# Patient Record
Sex: MALE | Race: WHITE | URBAN - METROPOLITAN AREA
[De-identification: names, ages, dates, MRNs, and addresses within clinical notes are randomized per-mention and may not be internally consistent; named-entity substitution may affect disease eponyms.]

---

## 2020-03-02 ENCOUNTER — APPOINTMENT (OUTPATIENT)
Dept: URBAN - METROPOLITAN AREA CLINIC 232 | Age: 65
Setting detail: DERMATOLOGY
End: 2020-03-02

## 2020-03-02 DIAGNOSIS — Z79.899 OTHER LONG TERM (CURRENT) DRUG THERAPY: ICD-10-CM

## 2020-03-02 DIAGNOSIS — L40.0 PSORIASIS VULGARIS: ICD-10-CM

## 2020-03-02 PROCEDURE — OTHER ORDER TESTS: OTHER

## 2020-03-02 PROCEDURE — OTHER PRESCRIPTION: OTHER

## 2020-03-02 PROCEDURE — 99213 OFFICE O/P EST LOW 20 MIN: CPT

## 2020-03-02 PROCEDURE — OTHER METHOTREXATE MONITORING: OTHER

## 2020-03-02 ASSESSMENT — PGA PSORIASIS: PGA PSORIASIS: MINIMAL (MINIMAL PLAQUE ELEVATION, FAINT ERYTHEMA, OCCASIONAL FINE SCALE)

## 2020-03-02 NOTE — PROCEDURE: METHOTREXATE MONITORING
Comments: Taking folic acid
Detail Level: Zone
Add High Risk Medication Management Associated Diagnosis?: No
Current Dosage (Optional): 12.5mg/week
Most Recent Cbc (Optional): 1/17/20 WNL
Length Of Therapy (Optional): 2.5 years

## 2020-03-02 NOTE — PROCEDURE: ORDER TESTS
Expected Date Of Service: 03/02/2020
Bill For Surgical Tray: no
Billing Type: Third-Party Bill
Clinical Notes (To The Lab): S/O Q 3 months

## 2020-05-13 ENCOUNTER — RX ONLY (RX ONLY)
Age: 65
End: 2020-05-13

## 2020-05-13 RX ORDER — CALCIPOTRIENE 0.05 MG/G
CREAM TOPICAL
Qty: 1 | Refills: 2 | Status: ERX | COMMUNITY
Start: 2020-05-13

## 2020-09-01 ENCOUNTER — APPOINTMENT (OUTPATIENT)
Dept: URBAN - METROPOLITAN AREA CLINIC 232 | Age: 65
Setting detail: DERMATOLOGY
End: 2020-09-02

## 2020-09-01 DIAGNOSIS — L71.8 OTHER ROSACEA: ICD-10-CM

## 2020-09-01 DIAGNOSIS — L40.0 PSORIASIS VULGARIS: ICD-10-CM

## 2020-09-01 DIAGNOSIS — Z79.899 OTHER LONG TERM (CURRENT) DRUG THERAPY: ICD-10-CM

## 2020-09-01 PROCEDURE — OTHER METHOTREXATE MONITORING: OTHER

## 2020-09-01 PROCEDURE — OTHER ORDER TESTS: OTHER

## 2020-09-01 PROCEDURE — OTHER PRESCRIPTION: OTHER

## 2020-09-01 PROCEDURE — OTHER COUNSELING: OTHER

## 2020-09-01 PROCEDURE — OTHER ADDITIONAL NOTES: OTHER

## 2020-09-01 PROCEDURE — 99214 OFFICE O/P EST MOD 30 MIN: CPT

## 2020-09-01 RX ORDER — METHOTREXATE SODIUM 2.5 MG/1
TABLET ORAL
Qty: 24 | Refills: 5 | Status: ERX

## 2020-09-01 RX ORDER — FOLIC ACID 1 MG/1
TABLET ORAL QD
Qty: 30 | Refills: 5 | Status: ERX

## 2020-09-01 RX ORDER — CALCIPOTRIENE 0.05 MG/G
CREAM TOPICAL
Qty: 1 | Refills: 3 | Status: ERX | COMMUNITY
Start: 2020-09-01

## 2020-09-01 RX ORDER — BETAMETHASONE DIPROPIONATE 0.5 MG/G
CREAM, AUGMENTED TOPICAL
Qty: 1 | Refills: 2 | Status: ERX | COMMUNITY
Start: 2020-09-01

## 2020-09-01 RX ORDER — MINOCYCLINE HYDROCHLORIDE 100 MG/1
CAPSULE ORAL
Qty: 60 | Refills: 3 | Status: ERX | COMMUNITY
Start: 2020-09-01

## 2020-09-01 ASSESSMENT — LOCATION ZONE DERM
LOCATION ZONE: FACE
LOCATION ZONE: FEET
LOCATION ZONE: LEG
LOCATION ZONE: ARM
LOCATION ZONE: EAR

## 2020-09-01 ASSESSMENT — LOCATION DETAILED DESCRIPTION DERM
LOCATION DETAILED: LEFT CENTRAL MALAR CHEEK
LOCATION DETAILED: RIGHT ELBOW
LOCATION DETAILED: LEFT POSTERIOR LATERAL MALLEOLUS
LOCATION DETAILED: LEFT DORSAL FOOT
LOCATION DETAILED: LEFT SUPERIOR CRUS OF ANTIHELIX
LOCATION DETAILED: LEFT KNEE

## 2020-09-01 ASSESSMENT — LOCATION SIMPLE DESCRIPTION DERM
LOCATION SIMPLE: LEFT CHEEK
LOCATION SIMPLE: LEFT FOOT
LOCATION SIMPLE: LEFT ANKLE
LOCATION SIMPLE: LEFT EAR
LOCATION SIMPLE: RIGHT ELBOW
LOCATION SIMPLE: LEFT KNEE

## 2020-09-01 ASSESSMENT — PGA PSORIASIS: PGA PSORIASIS 2020: ALMOST CLEAR

## 2020-09-01 ASSESSMENT — BSA PSORIASIS: % BODY COVERED IN PSORIASIS: 2

## 2020-09-01 NOTE — PROCEDURE: METHOTREXATE MONITORING
Most Recent Cbc (Optional): WNL 8/18/20
Comments: Pt has been using 5 tabs weekly of MTX but may try to incresae to 6 tabs weekly to determine benefit. He has been on 8 tabs weekly in the past but was doing well so he has slowly decreased his dose. Pt reports he has never had side effects with any dose. Since pt has been very stable with labs since he has been on the MTX starting 3 years ago, will have him do his labs in 6 months.
Most Recent Lfts (Optional): AST 22, ALT 25 8/18/20
Current Dosage (Optional): 12.5mg/week
Length Of Therapy (Optional): 3 yrs
Add High Risk Medication Management Associated Diagnosis?: Yes
Detail Level: Zone

## 2020-09-01 NOTE — PROCEDURE: ADDITIONAL NOTES
Additional Notes: Pt had considered Humira at one point but was not eligible for pt assistance and his co-pay was very high so he prefers to remain on MTX although he still has minor flares. He has an apporved prior auth for Calcipotriene cream thru 2/9/2021. Pt will probably use [facility] for his Folic Acid since it will be cheaper than OTC.
Detail Level: Detailed

## 2020-09-01 NOTE — PROCEDURE: COUNSELING
Patient Specific Counseling (Will Not Stick From Patient To Patient): Pt will use the minocycline prn for flares. He has been using a OTC topical product that has worked well. He cannot recall the name.
Detail Level: Zone

## 2021-02-25 ENCOUNTER — APPOINTMENT (OUTPATIENT)
Dept: URBAN - METROPOLITAN AREA CLINIC 232 | Age: 66
Setting detail: DERMATOLOGY
End: 2021-02-26

## 2021-02-25 ENCOUNTER — RX ONLY (RX ONLY)
Age: 66
End: 2021-02-25

## 2021-02-25 DIAGNOSIS — L40.0 PSORIASIS VULGARIS: ICD-10-CM

## 2021-02-25 DIAGNOSIS — Z79.899 OTHER LONG TERM (CURRENT) DRUG THERAPY: ICD-10-CM

## 2021-02-25 PROCEDURE — OTHER METHOTREXATE MONITORING: OTHER

## 2021-02-25 PROCEDURE — OTHER COUNSELING: OTHER

## 2021-02-25 PROCEDURE — OTHER PRESCRIPTION: OTHER

## 2021-02-25 PROCEDURE — OTHER ADDITIONAL NOTES: OTHER

## 2021-02-25 PROCEDURE — OTHER TREATMENT REGIMEN: OTHER

## 2021-02-25 PROCEDURE — OTHER ORDER TESTS: OTHER

## 2021-02-25 PROCEDURE — 99213 OFFICE O/P EST LOW 20 MIN: CPT

## 2021-02-25 RX ORDER — FOLIC ACID 1 MG/1
TABLET ORAL
Qty: 30 | Refills: 11 | Status: ERX

## 2021-02-25 RX ORDER — CALCIPOTRIENE 0.05 MG/G
CREAM TOPICAL
Qty: 1 | Refills: 11 | Status: ERX

## 2021-02-25 RX ORDER — BETAMETHASONE DIPROPIONATE 0.5 MG/ML
LOTION, AUGMENTED TOPICAL
Qty: 1 | Refills: 3 | Status: CANCELLED

## 2021-02-25 RX ORDER — BETAMETHASONE DIPROPIONATE 0.5 MG/G
CREAM, AUGMENTED TOPICAL
Qty: 1 | Refills: 5 | Status: ERX

## 2021-02-25 RX ORDER — METHOTREXATE SODIUM 2.5 MG/1
TABLET ORAL
Qty: 24 | Refills: 11 | Status: ERX

## 2021-02-25 ASSESSMENT — LOCATION SIMPLE DESCRIPTION DERM
LOCATION SIMPLE: RIGHT ELBOW
LOCATION SIMPLE: LEFT KNEE
LOCATION SIMPLE: LEFT ANKLE
LOCATION SIMPLE: LEFT FOOT
LOCATION SIMPLE: RIGHT KNEE
LOCATION SIMPLE: LEFT ELBOW
LOCATION SIMPLE: LEFT EAR

## 2021-02-25 ASSESSMENT — LOCATION DETAILED DESCRIPTION DERM
LOCATION DETAILED: LEFT ELBOW
LOCATION DETAILED: RIGHT ELBOW
LOCATION DETAILED: LEFT SUPERIOR CRUS OF ANTIHELIX
LOCATION DETAILED: LEFT POSTERIOR LATERAL MALLEOLUS
LOCATION DETAILED: LEFT DORSAL FOOT
LOCATION DETAILED: RIGHT KNEE
LOCATION DETAILED: LEFT KNEE

## 2021-02-25 ASSESSMENT — LOCATION ZONE DERM
LOCATION ZONE: ARM
LOCATION ZONE: FEET
LOCATION ZONE: EAR
LOCATION ZONE: LEG

## 2021-02-25 NOTE — PROCEDURE: ADDITIONAL NOTES
Detail Level: Detailed
Additional Notes: Pt had considered Humira at one point but was not eligible for pt assistance and his co-pay was very high so he prefers to remain on MTX although he still has minor flares. Pt will probably use [facility] for his Folic Acid since it will be cheaper than OTC.

## 2021-02-25 NOTE — PROCEDURE: TREATMENT REGIMEN
Action 2: Continue
Sig For Treatment 1 (If Needed): 12.5 mg weekly
Treatment 1: Methotrexate
Sig For Treatment 2 (If Needed): twice daily
Treatment 3: Calcipotriene cream
Other Instructions: Pt will use the Betamethasone bid for 2 weeks to help clear the remaider of his plaques then back to weekends and calcipotriene on weekdays. He is not interested in changing his regimen since it is working well and affordable.
Treatment 2: Betamethasone diproprionate cream
Detail Level: Zone

## 2021-02-25 NOTE — PROCEDURE: METHOTREXATE MONITORING
Detail Level: Zone
Most Recent Cbc (Optional): WNL 8/18/20
Comments: Pt has been using 6 tabs weekly of MTX.  Pt reports he has never had side effects with any dose. Since pt has been very stable with labs since he has been on the MTX starting 3 years ago, will have him do his labs before next visit.
Current Dosage (Optional): 12.5mg/week
Length Of Therapy (Optional): 3 yrs
Add High Risk Medication Management Associated Diagnosis?: Yes
Most Recent Lfts (Optional): AST 22, ALT 25 8/18/20

## 2021-03-08 ENCOUNTER — APPOINTMENT (OUTPATIENT)
Dept: URBAN - METROPOLITAN AREA CLINIC 232 | Age: 66
Setting detail: DERMATOLOGY
End: 2021-03-09

## 2021-03-08 DIAGNOSIS — L40.0 PSORIASIS VULGARIS: ICD-10-CM

## 2021-03-08 PROCEDURE — OTHER PATIENT SPECIFIC COUNSELING: OTHER

## 2021-03-08 PROCEDURE — OTHER ORDER TESTS: OTHER

## 2021-03-08 NOTE — PROCEDURE: PATIENT SPECIFIC COUNSELING
Pt will try to lower his dose further and see if he can remian clear. He knows he can increase back to 15mg q week if needed. Lab slip will be mailed to his home address. He knows to get it checked again in 3-4 months.
Detail Level: Zone

## 2021-06-16 ENCOUNTER — APPOINTMENT (OUTPATIENT)
Dept: URBAN - METROPOLITAN AREA CLINIC 232 | Age: 66
Setting detail: DERMATOLOGY
End: 2021-06-16

## 2021-06-16 DIAGNOSIS — L40.0 PSORIASIS VULGARIS: ICD-10-CM

## 2021-06-16 PROCEDURE — OTHER ORDER TESTS: OTHER

## 2021-06-16 PROCEDURE — OTHER PATIENT SPECIFIC COUNSELING: OTHER

## 2021-06-16 NOTE — PROCEDURE: PATIENT SPECIFIC COUNSELING
Lab slip will be mailed to his home address. He knows to get it checked again in 3-4 months.
Detail Level: Zone

## 2021-12-03 ENCOUNTER — APPOINTMENT (OUTPATIENT)
Dept: URBAN - METROPOLITAN AREA CLINIC 232 | Age: 66
Setting detail: DERMATOLOGY
End: 2021-12-03

## 2021-12-03 DIAGNOSIS — L40.0 PSORIASIS VULGARIS: ICD-10-CM

## 2021-12-03 PROCEDURE — OTHER ORDER TESTS: OTHER

## 2021-12-03 PROCEDURE — OTHER PATIENT SPECIFIC COUNSELING: OTHER

## 2021-12-03 NOTE — PROCEDURE: PATIENT SPECIFIC COUNSELING
Detail Level: Detailed
Pt is overdue for labs for MTX. New labslip aaron be mailed to home address. These are due before the end of the year.

## 2021-12-14 ENCOUNTER — APPOINTMENT (OUTPATIENT)
Dept: URBAN - METROPOLITAN AREA CLINIC 232 | Age: 66
Setting detail: DERMATOLOGY
End: 2021-12-15

## 2021-12-14 DIAGNOSIS — L40.0 PSORIASIS VULGARIS: ICD-10-CM

## 2021-12-14 PROCEDURE — OTHER ORDER TESTS: OTHER

## 2021-12-14 PROCEDURE — OTHER METHOTREXATE MONITORING: OTHER

## 2021-12-14 NOTE — PROCEDURE: METHOTREXATE MONITORING
Current Dosage (Optional): 15mg/week
Add High Risk Medication Management Associated Diagnosis?: Yes
Detail Level: Zone
Most Recent Lfts (Optional): 12/13/21
Comments: Labslip was mailed to pt's home address and to be done April 2022.

## 2022-02-21 RX ORDER — METHOTREXATE SODIUM 2.5 MG/1
TABLET ORAL
Qty: 24 | Refills: 0 | Status: ERX

## 2022-02-28 ENCOUNTER — APPOINTMENT (OUTPATIENT)
Dept: URBAN - METROPOLITAN AREA CLINIC 232 | Age: 67
Setting detail: DERMATOLOGY
End: 2022-03-01

## 2022-02-28 DIAGNOSIS — L40.0 PSORIASIS VULGARIS: ICD-10-CM

## 2022-02-28 DIAGNOSIS — L57.0 ACTINIC KERATOSIS: ICD-10-CM

## 2022-02-28 PROCEDURE — OTHER ORDER TESTS: OTHER

## 2022-02-28 PROCEDURE — OTHER COUNSELING: OTHER

## 2022-02-28 PROCEDURE — OTHER METHOTREXATE MONITORING: OTHER

## 2022-02-28 PROCEDURE — OTHER PRESCRIPTION: OTHER

## 2022-02-28 PROCEDURE — OTHER TREATMENT REGIMEN: OTHER

## 2022-02-28 PROCEDURE — OTHER MIPS QUALITY: OTHER

## 2022-02-28 PROCEDURE — 17000 DESTRUCT PREMALG LESION: CPT

## 2022-02-28 PROCEDURE — 99213 OFFICE O/P EST LOW 20 MIN: CPT | Mod: 25

## 2022-02-28 PROCEDURE — OTHER LIQUID NITROGEN: OTHER

## 2022-02-28 RX ORDER — FOLIC ACID 1 MG/1
TABLET ORAL QD
Qty: 30 | Refills: 11 | Status: ERX

## 2022-02-28 RX ORDER — METHOTREXATE SODIUM 2.5 MG/1
TABLET ORAL
Qty: 24 | Refills: 11 | Status: ERX

## 2022-02-28 RX ORDER — BETAMETHASONE DIPROPIONATE 0.5 MG/G
CREAM, AUGMENTED TOPICAL
Qty: 50 | Refills: 5 | Status: ERX

## 2022-02-28 ASSESSMENT — LOCATION ZONE DERM
LOCATION ZONE: ARM
LOCATION ZONE: LEG
LOCATION ZONE: FACE
LOCATION ZONE: NOSE

## 2022-02-28 ASSESSMENT — LOCATION SIMPLE DESCRIPTION DERM
LOCATION SIMPLE: LEFT ELBOW
LOCATION SIMPLE: NOSE
LOCATION SIMPLE: RIGHT TEMPLE
LOCATION SIMPLE: RIGHT ELBOW
LOCATION SIMPLE: LEFT KNEE
LOCATION SIMPLE: RIGHT KNEE

## 2022-02-28 ASSESSMENT — LOCATION DETAILED DESCRIPTION DERM
LOCATION DETAILED: NASAL TIP
LOCATION DETAILED: RIGHT INFERIOR TEMPLE
LOCATION DETAILED: LEFT KNEE
LOCATION DETAILED: LEFT ELBOW
LOCATION DETAILED: RIGHT KNEE
LOCATION DETAILED: RIGHT ELBOW

## 2022-02-28 NOTE — PROCEDURE: METHOTREXATE MONITORING
Detail Level: Zone
Length Of Therapy (Optional): 3 yrs
Most Recent Cbc (Optional): WNL 12/13/21
Current Dosage (Optional): 12.5mg/week
Comments: Pt has been using 6 tabs weekly of MTX.  Pt reports he has never had side effects with any dose. Since pt has been very stable with labs since he has been on the MTX starting 3 years ago, will have him do his labs before next visit.
Add High Risk Medication Management Associated Diagnosis?: Yes

## 2022-02-28 NOTE — PROCEDURE: TREATMENT REGIMEN
Detail Level: Zone
Sig For Treatment 2 (If Needed): twice daily
Action 4: Continue
Continue Regimen: Folic Acid 1mg qd on non-MTX days
Treatment 2: Betamethasone diproprionate cream
Other Instructions: Lab slip given today
Sig For Treatment 1 (If Needed): 12.5 mg weekly
Sig For Treatment 3 (If Needed): applied twice daily Monday-Friday
Treatment 3: Calcipotriene cream
Treatment 1: Methotrexate

## 2022-02-28 NOTE — PROCEDURE: LIQUID NITROGEN
Render Note In Bullet Format When Appropriate: No
Consent: The patient's consent was obtained including but not limited to risks of crusting, scabbing, blistering, scarring, darker or lighter pigmentary change, recurrence, incomplete removal and infection.
Duration Of Freeze Thaw-Cycle (Seconds): 10
Detail Level: Detailed
Show Aperture Variable?: Yes
Application Tool (Optional): Cotton Tipped Applicator
Number Of Freeze-Thaw Cycles: 1 freeze-thaw cycle
Post-Care Instructions: I reviewed with the patient in detail post-care instructions. Patient is to wear sunprotection, and avoid picking at any of the treated lesions. Pt may apply Vaseline to crusted or scabbing areas.

## 2022-04-13 ENCOUNTER — APPOINTMENT (OUTPATIENT)
Dept: URBAN - METROPOLITAN AREA CLINIC 232 | Age: 67
Setting detail: DERMATOLOGY
End: 2022-04-14

## 2022-04-13 DIAGNOSIS — L40.0 PSORIASIS VULGARIS: ICD-10-CM

## 2022-04-13 PROCEDURE — OTHER ORDER TESTS: OTHER

## 2022-04-13 PROCEDURE — OTHER TREATMENT REGIMEN: OTHER

## 2022-04-13 PROCEDURE — OTHER COUNSELING: OTHER

## 2022-04-13 PROCEDURE — OTHER METHOTREXATE MONITORING: OTHER

## 2022-04-13 ASSESSMENT — LOCATION DETAILED DESCRIPTION DERM
LOCATION DETAILED: RIGHT ELBOW
LOCATION DETAILED: LEFT ELBOW
LOCATION DETAILED: RIGHT KNEE
LOCATION DETAILED: LEFT KNEE

## 2022-04-13 ASSESSMENT — LOCATION SIMPLE DESCRIPTION DERM
LOCATION SIMPLE: RIGHT KNEE
LOCATION SIMPLE: LEFT KNEE
LOCATION SIMPLE: RIGHT ELBOW
LOCATION SIMPLE: LEFT ELBOW

## 2022-04-13 ASSESSMENT — LOCATION ZONE DERM
LOCATION ZONE: ARM
LOCATION ZONE: LEG

## 2022-04-13 NOTE — PROCEDURE: TREATMENT REGIMEN
Other Instructions: Lab slip mailed to pt's home address to be done in Aug
Detail Level: Zone
Action 4: Continue
Continue Regimen: MTX 2.5mg 6 tabs q week\\nFolic Acid 1mg qd on non-MTX days
Sig For Treatment 1 (If Needed): 15 mg weekly
Sig For Treatment 3 (If Needed): applied twice daily Monday-Friday
Sig For Treatment 2 (If Needed): twice daily

## 2022-08-16 ENCOUNTER — APPOINTMENT (OUTPATIENT)
Dept: URBAN - METROPOLITAN AREA CLINIC 232 | Age: 67
Setting detail: DERMATOLOGY
End: 2022-08-17

## 2022-08-16 DIAGNOSIS — L40.0 PSORIASIS VULGARIS: ICD-10-CM

## 2022-08-16 PROCEDURE — OTHER METHOTREXATE MONITORING: OTHER

## 2022-08-16 PROCEDURE — OTHER ORDER TESTS: OTHER

## 2022-08-16 PROCEDURE — OTHER TREATMENT REGIMEN: OTHER

## 2022-08-16 PROCEDURE — OTHER COUNSELING: OTHER

## 2022-08-16 ASSESSMENT — LOCATION ZONE DERM
LOCATION ZONE: LEG
LOCATION ZONE: ARM

## 2022-08-16 ASSESSMENT — LOCATION DETAILED DESCRIPTION DERM
LOCATION DETAILED: RIGHT ELBOW
LOCATION DETAILED: LEFT KNEE
LOCATION DETAILED: LEFT ELBOW
LOCATION DETAILED: RIGHT KNEE

## 2022-08-16 ASSESSMENT — LOCATION SIMPLE DESCRIPTION DERM
LOCATION SIMPLE: RIGHT ELBOW
LOCATION SIMPLE: LEFT ELBOW
LOCATION SIMPLE: LEFT KNEE
LOCATION SIMPLE: RIGHT KNEE

## 2022-08-16 NOTE — PROCEDURE: METHOTREXATE MONITORING
Dosage 10 (Mg/Week): 0
Add Additional Dosage: No
Add High Risk Medication Management Associated Diagnosis?: Yes
Detail Level: Zone
Most Recent Lfts (Optional): WNL 8/15/22
Most Recent Cbc (Optional): WNL 8/15/22, except slightly low H/H
Calculate Cumulative Dosage Automatically?: No - Use Free Text
Length Of Therapy (Optional): 4 years
Comments: Pt has been using 6 tabs weekly of MTX.  Pt reports he has never had side effects with any dose. He will continue same regimen and recheck labs in Dec. He sees his PCP in Nov and will bring notify Dr Salazar of slightly low H/H so he is aware.
Current Dosage (Optional): 15mg/week
Document Previous Cumulative Dosage (Historical Patients Only): No - New Methotrexate Patient

## 2022-08-16 NOTE — PROCEDURE: TREATMENT REGIMEN
Action 1: Continue
Detail Level: Zone
Sig For Treatment 2 (If Needed): twice daily
Sig For Treatment 1 (If Needed): 15 mg weekly
Continue Regimen: MTX 2.5mg 6 tabs q week\\nFolic Acid 1mg qd on non-MTX days
Other Instructions: Lab slip mailed to pt's home address to be done in Dec
Sig For Treatment 3 (If Needed): applied twice daily Monday-Friday

## 2022-08-19 ENCOUNTER — RX ONLY (RX ONLY)
Age: 67
End: 2022-08-19

## 2022-08-19 RX ORDER — CALCIPOTRIENE 0.05 MG/G
CREAM TOPICAL
Qty: 60 | Refills: 11 | Status: ERX

## 2022-12-07 ENCOUNTER — APPOINTMENT (OUTPATIENT)
Dept: URBAN - METROPOLITAN AREA CLINIC 232 | Age: 67
Setting detail: DERMATOLOGY
End: 2022-12-07

## 2022-12-07 DIAGNOSIS — L40.0 PSORIASIS VULGARIS: ICD-10-CM

## 2022-12-07 PROCEDURE — OTHER TREATMENT REGIMEN: OTHER

## 2022-12-07 PROCEDURE — OTHER METHOTREXATE MONITORING: OTHER

## 2022-12-07 PROCEDURE — OTHER COUNSELING: OTHER

## 2022-12-07 PROCEDURE — OTHER ORDER TESTS: OTHER

## 2022-12-07 ASSESSMENT — LOCATION DETAILED DESCRIPTION DERM
LOCATION DETAILED: RIGHT KNEE
LOCATION DETAILED: RIGHT ELBOW
LOCATION DETAILED: LEFT ELBOW
LOCATION DETAILED: LEFT KNEE

## 2022-12-07 ASSESSMENT — LOCATION ZONE DERM
LOCATION ZONE: LEG
LOCATION ZONE: ARM

## 2022-12-07 NOTE — PROCEDURE: METHOTREXATE MONITORING
Dosage 13 (Mg/Week): 0
Add Additional Dosage: No
Comments: Pt has been using 6 tabs weekly of MTX.  Pt reports he has never had side effects with any dose and pleased with his results.
Length Of Therapy (Optional): 4 years
Most Recent Lfts (Optional): WNL 11/18/22
Add High Risk Medication Management Associated Diagnosis?: Yes
Document Previous Cumulative Dosage (Historical Patients Only): No - New Methotrexate Patient
Calculate Cumulative Dosage Automatically?: No - Use Free Text
Current Dosage (Optional): 15mg/week
Detail Level: Zone

## 2022-12-07 NOTE — PROCEDURE: TREATMENT REGIMEN
Continue Regimen: MTX 2.5mg 6 tabs q week\\nFolic Acid 1mg qd on non-MTX days
Other Instructions: Lab slip mailed to pt's home address to be done quarterly
Sig For Treatment 1 (If Needed): 15 mg weekly
Action 1: Continue
Sig For Treatment 3 (If Needed): applied twice daily Monday-Friday
Detail Level: Zone
Sig For Treatment 2 (If Needed): twice daily

## 2023-02-17 RX ORDER — METHOTREXATE SODIUM 2.5 MG/1
TABLET ORAL
Qty: 24 | Refills: 1 | Status: ERX

## 2023-03-01 ENCOUNTER — APPOINTMENT (OUTPATIENT)
Dept: URBAN - METROPOLITAN AREA CLINIC 232 | Age: 68
Setting detail: DERMATOLOGY
End: 2023-03-02

## 2023-03-01 VITALS — HEIGHT: 68 IN | WEIGHT: 207 LBS

## 2023-03-01 DIAGNOSIS — L81.4 OTHER MELANIN HYPERPIGMENTATION: ICD-10-CM

## 2023-03-01 DIAGNOSIS — L40.0 PSORIASIS VULGARIS: ICD-10-CM

## 2023-03-01 DIAGNOSIS — L82.1 OTHER SEBORRHEIC KERATOSIS: ICD-10-CM

## 2023-03-01 DIAGNOSIS — L91.8 OTHER HYPERTROPHIC DISORDERS OF THE SKIN: ICD-10-CM

## 2023-03-01 DIAGNOSIS — D22 MELANOCYTIC NEVI: ICD-10-CM

## 2023-03-01 DIAGNOSIS — D18.0 HEMANGIOMA: ICD-10-CM

## 2023-03-01 PROBLEM — D22.5 MELANOCYTIC NEVI OF TRUNK: Status: ACTIVE | Noted: 2023-03-01

## 2023-03-01 PROBLEM — D18.01 HEMANGIOMA OF SKIN AND SUBCUTANEOUS TISSUE: Status: ACTIVE | Noted: 2023-03-01

## 2023-03-01 PROCEDURE — 99213 OFFICE O/P EST LOW 20 MIN: CPT

## 2023-03-01 PROCEDURE — OTHER TREATMENT REGIMEN: OTHER

## 2023-03-01 PROCEDURE — OTHER METHOTREXATE MONITORING: OTHER

## 2023-03-01 PROCEDURE — OTHER PRESCRIPTION: OTHER

## 2023-03-01 PROCEDURE — OTHER ORDER TESTS: OTHER

## 2023-03-01 PROCEDURE — OTHER MIPS QUALITY: OTHER

## 2023-03-01 PROCEDURE — OTHER COUNSELING: OTHER

## 2023-03-01 RX ORDER — FOLIC ACID 1 MG/1
TABLET ORAL QD
Qty: 30 | Refills: 11 | Status: ERX

## 2023-03-01 RX ORDER — METHOTREXATE SODIUM 2.5 MG/1
TABLET ORAL
Qty: 24 | Refills: 11 | Status: ERX

## 2023-03-01 RX ORDER — BETAMETHASONE DIPROPIONATE 0.5 MG/G
CREAM, AUGMENTED TOPICAL
Qty: 50 | Refills: 3 | Status: ERX

## 2023-03-01 ASSESSMENT — ITCH NUMERIC RATING SCALE: HOW SEVERE IS YOUR ITCHING?: 0

## 2023-03-01 ASSESSMENT — LOCATION SIMPLE DESCRIPTION DERM
LOCATION SIMPLE: LOWER BACK
LOCATION SIMPLE: RIGHT KNEE
LOCATION SIMPLE: LEFT LOWER BACK
LOCATION SIMPLE: LEFT UPPER BACK
LOCATION SIMPLE: LEFT ANTERIOR NECK
LOCATION SIMPLE: RIGHT ANTERIOR NECK
LOCATION SIMPLE: UPPER BACK
LOCATION SIMPLE: LEFT KNEE

## 2023-03-01 ASSESSMENT — LOCATION DETAILED DESCRIPTION DERM
LOCATION DETAILED: SUPERIOR THORACIC SPINE
LOCATION DETAILED: LEFT MEDIAL UPPER BACK
LOCATION DETAILED: LEFT SUPERIOR MEDIAL MIDBACK
LOCATION DETAILED: LEFT KNEE
LOCATION DETAILED: RIGHT CLAVICULAR NECK
LOCATION DETAILED: LEFT CLAVICULAR NECK
LOCATION DETAILED: SACRAL SPINE
LOCATION DETAILED: RIGHT KNEE

## 2023-03-01 ASSESSMENT — BSA PSORIASIS: % BODY COVERED IN PSORIASIS: 1

## 2023-03-01 ASSESSMENT — PGA PSORIASIS: PGA PSORIASIS 2020: ALMOST CLEAR

## 2023-03-01 ASSESSMENT — LOCATION ZONE DERM
LOCATION ZONE: TRUNK
LOCATION ZONE: NECK
LOCATION ZONE: LEG

## 2023-03-01 NOTE — PROCEDURE: TREATMENT REGIMEN
Other Instructions: Pt will call for refill for calcipotriene but has plenty at this time. This required a prior auth last time so he is aware it will need a new one when he wants an rx.
Detail Level: Zone
Action 1: Continue
Continue Regimen: Folic Acid 1mg qd on non-MTX days\\nMethotrexate 2.5mg (6 tabs weekly)\\nBetamethasone Augmented cream twice daily weekends prn\\nCalcipotriene cream applied twice daily weekdays prn

## 2023-03-01 NOTE — PROCEDURE: METHOTREXATE MONITORING
Add Additional Dosage: No
Dosage 5 (Mg/Week): 0
Comments: Lab slip given today due June 2023.
Calculate Cumulative Dosage Automatically?: No - Use Free Text
Most Recent Cbc (Optional): WNL 2/22/23
Add High Risk Medication Management Associated Diagnosis?: Yes
Document Previous Cumulative Dosage (Historical Patients Only): No - New Methotrexate Patient
Current Dosage (Optional): 15mg/week
Length Of Therapy (Optional): Started 2017
Detail Level: Zone

## 2023-03-01 NOTE — PROCEDURE: COUNSELING
Detail Level: Zone
Sunscreen Recommendations: Suggest using sunscreen of SPF 30 or higher
Detail Level: Generalized

## 2023-06-16 ENCOUNTER — APPOINTMENT (OUTPATIENT)
Dept: URBAN - METROPOLITAN AREA CLINIC 232 | Age: 68
Setting detail: DERMATOLOGY
End: 2023-06-16

## 2023-06-16 DIAGNOSIS — L40.0 PSORIASIS VULGARIS: ICD-10-CM

## 2023-06-16 PROCEDURE — OTHER TREATMENT REGIMEN: OTHER

## 2023-06-16 PROCEDURE — OTHER ORDER TESTS: OTHER

## 2023-06-16 PROCEDURE — OTHER METHOTREXATE MONITORING: OTHER

## 2023-06-16 PROCEDURE — OTHER COUNSELING: OTHER

## 2023-06-16 ASSESSMENT — LOCATION SIMPLE DESCRIPTION DERM
LOCATION SIMPLE: LEFT ELBOW
LOCATION SIMPLE: RIGHT ELBOW
LOCATION SIMPLE: RIGHT KNEE
LOCATION SIMPLE: LEFT KNEE

## 2023-06-16 ASSESSMENT — LOCATION DETAILED DESCRIPTION DERM
LOCATION DETAILED: LEFT KNEE
LOCATION DETAILED: RIGHT KNEE
LOCATION DETAILED: LEFT ELBOW
LOCATION DETAILED: RIGHT ELBOW

## 2023-06-16 ASSESSMENT — LOCATION ZONE DERM
LOCATION ZONE: ARM
LOCATION ZONE: LEG

## 2023-06-16 NOTE — PROCEDURE: METHOTREXATE MONITORING
Dosage 13 (Mg/Week): 0
Add Additional Dosage: No
Comments: Pt has been using 6 tabs weekly of MTX.  Pt reports he has never had side effects with any dose and pleased with his results.
Length Of Therapy (Optional): 4 years
Most Recent Lfts (Optional): WNL 6/8/23
Add High Risk Medication Management Associated Diagnosis?: Yes
Document Previous Cumulative Dosage (Historical Patients Only): No - New Methotrexate Patient
Calculate Cumulative Dosage Automatically?: No - Use Free Text
Current Dosage (Optional): 15mg/week
Detail Level: Zone

## 2023-06-19 ENCOUNTER — RX ONLY (RX ONLY)
Age: 68
End: 2023-06-19

## 2023-06-19 RX ORDER — CALCIPOTRIENE 0.05 MG/G
CREAM TOPICAL
Qty: 120 | Refills: 11 | Status: ERX

## 2023-10-11 ENCOUNTER — APPOINTMENT (OUTPATIENT)
Dept: URBAN - METROPOLITAN AREA CLINIC 232 | Age: 68
Setting detail: DERMATOLOGY
End: 2023-10-11

## 2023-10-11 DIAGNOSIS — L40.0 PSORIASIS VULGARIS: ICD-10-CM

## 2023-10-11 PROCEDURE — OTHER ORDER TESTS: OTHER

## 2023-10-11 PROCEDURE — OTHER COUNSELING: OTHER

## 2023-10-11 PROCEDURE — OTHER TREATMENT REGIMEN: OTHER

## 2023-10-11 PROCEDURE — OTHER METHOTREXATE MONITORING: OTHER

## 2023-10-11 ASSESSMENT — LOCATION DETAILED DESCRIPTION DERM
LOCATION DETAILED: RIGHT KNEE
LOCATION DETAILED: LEFT KNEE
LOCATION DETAILED: LEFT ELBOW
LOCATION DETAILED: RIGHT ELBOW

## 2023-10-11 ASSESSMENT — LOCATION SIMPLE DESCRIPTION DERM
LOCATION SIMPLE: LEFT ELBOW
LOCATION SIMPLE: RIGHT KNEE
LOCATION SIMPLE: RIGHT ELBOW
LOCATION SIMPLE: LEFT KNEE

## 2023-10-11 ASSESSMENT — LOCATION ZONE DERM
LOCATION ZONE: ARM
LOCATION ZONE: LEG

## 2023-10-11 NOTE — PROCEDURE: METHOTREXATE MONITORING
Dosage 13 (Mg/Week): 0
Add Additional Dosage: No
Comments: Pt has been using 6 tabs weekly of MTX.  Pt reports he has never had side effects with any dose and pleased with his results.
Length Of Therapy (Optional): 4 years
Most Recent Lfts (Optional): WNL 10/9/23
Add High Risk Medication Management Associated Diagnosis?: Yes
Document Previous Cumulative Dosage (Historical Patients Only): No - New Methotrexate Patient
Calculate Cumulative Dosage Automatically?: No - Use Free Text
Most Recent Cbc (Optional): H/H slightly low 10/9/23
Current Dosage (Optional): 15mg/week
Detail Level: Zone

## 2023-10-11 NOTE — PROCEDURE: TREATMENT REGIMEN
Continue Regimen: MTX 2.5mg 6 tabs q week\\nFolic Acid 1mg qd on non-MTX days
Other Instructions: Lab slip mailed to pt's home address to be done quarterly\\nLeft voice mail for pt regarding lab results
Sig For Treatment 1 (If Needed): 15 mg weekly
Action 1: Continue
Sig For Treatment 3 (If Needed): applied twice daily Monday-Friday
Detail Level: Zone
Sig For Treatment 2 (If Needed): twice daily

## 2024-03-06 ENCOUNTER — APPOINTMENT (OUTPATIENT)
Dept: URBAN - METROPOLITAN AREA CLINIC 232 | Age: 69
Setting detail: DERMATOLOGY
End: 2024-03-06

## 2024-03-06 DIAGNOSIS — L57.0 ACTINIC KERATOSIS: ICD-10-CM

## 2024-03-06 DIAGNOSIS — D18.0 HEMANGIOMA: ICD-10-CM

## 2024-03-06 DIAGNOSIS — L82.0 INFLAMED SEBORRHEIC KERATOSIS: ICD-10-CM

## 2024-03-06 DIAGNOSIS — L81.4 OTHER MELANIN HYPERPIGMENTATION: ICD-10-CM

## 2024-03-06 DIAGNOSIS — D22 MELANOCYTIC NEVI: ICD-10-CM

## 2024-03-06 DIAGNOSIS — L40.0 PSORIASIS VULGARIS: ICD-10-CM

## 2024-03-06 DIAGNOSIS — L82.1 OTHER SEBORRHEIC KERATOSIS: ICD-10-CM

## 2024-03-06 PROBLEM — D22.72 MELANOCYTIC NEVI OF LEFT LOWER LIMB, INCLUDING HIP: Status: ACTIVE | Noted: 2024-03-06

## 2024-03-06 PROBLEM — D22.71 MELANOCYTIC NEVI OF RIGHT LOWER LIMB, INCLUDING HIP: Status: ACTIVE | Noted: 2024-03-06

## 2024-03-06 PROBLEM — D22.5 MELANOCYTIC NEVI OF TRUNK: Status: ACTIVE | Noted: 2024-03-06

## 2024-03-06 PROBLEM — D18.01 HEMANGIOMA OF SKIN AND SUBCUTANEOUS TISSUE: Status: ACTIVE | Noted: 2024-03-06

## 2024-03-06 PROCEDURE — OTHER LIQUID NITROGEN: OTHER

## 2024-03-06 PROCEDURE — 17003 DESTRUCT PREMALG LES 2-14: CPT | Mod: 59

## 2024-03-06 PROCEDURE — OTHER COUNSELING: OTHER

## 2024-03-06 PROCEDURE — OTHER METHOTREXATE MONITORING: OTHER

## 2024-03-06 PROCEDURE — OTHER MIPS QUALITY: OTHER

## 2024-03-06 PROCEDURE — 17110 DESTRUCT B9 LESION 1-14: CPT

## 2024-03-06 PROCEDURE — 17000 DESTRUCT PREMALG LESION: CPT | Mod: 59

## 2024-03-06 PROCEDURE — OTHER ORDER TESTS: OTHER

## 2024-03-06 PROCEDURE — OTHER PRESCRIPTION: OTHER

## 2024-03-06 PROCEDURE — 99213 OFFICE O/P EST LOW 20 MIN: CPT | Mod: 25

## 2024-03-06 PROCEDURE — OTHER TREATMENT REGIMEN: OTHER

## 2024-03-06 RX ORDER — METHOTREXATE SODIUM 2.5 MG/1
TABLET ORAL QWEEK
Qty: 72 | Refills: 3 | Status: ERX | COMMUNITY
Start: 2024-03-06

## 2024-03-06 RX ORDER — FOLIC ACID 1 MG/1
TABLET ORAL QD
Qty: 90 | Refills: 3 | Status: ERX | COMMUNITY
Start: 2024-03-06

## 2024-03-06 RX ORDER — CALCIPOTRIENE 50 UG/G
CREAM TOPICAL
Qty: 180 | Refills: 3 | Status: ERX | COMMUNITY
Start: 2024-03-06

## 2024-03-06 RX ORDER — FLUOROURACIL 5 MG/G
CREAM TOPICAL
Qty: 40 | Refills: 1 | Status: ERX | COMMUNITY
Start: 2024-03-06

## 2024-03-06 ASSESSMENT — LOCATION ZONE DERM
LOCATION ZONE: LEG
LOCATION ZONE: FACE
LOCATION ZONE: TRUNK
LOCATION ZONE: SCALP
LOCATION ZONE: ARM
LOCATION ZONE: EAR

## 2024-03-06 ASSESSMENT — LOCATION DETAILED DESCRIPTION DERM
LOCATION DETAILED: LEFT ANTERIOR DISTAL THIGH
LOCATION DETAILED: MID-OCCIPITAL SCALP
LOCATION DETAILED: RIGHT CRUS OF HELIX
LOCATION DETAILED: LEFT SUPERIOR MEDIAL UPPER BACK
LOCATION DETAILED: RIGHT SUPERIOR PARIETAL SCALP
LOCATION DETAILED: LEFT SUPERIOR FOREHEAD
LOCATION DETAILED: RIGHT ANTERIOR DISTAL THIGH
LOCATION DETAILED: INFERIOR MID FOREHEAD
LOCATION DETAILED: RIGHT ELBOW
LOCATION DETAILED: RIGHT KNEE
LOCATION DETAILED: RIGHT SUPERIOR MEDIAL UPPER BACK
LOCATION DETAILED: LEFT MEDIAL FRONTAL SCALP
LOCATION DETAILED: LEFT MEDIAL UPPER BACK
LOCATION DETAILED: LEFT ELBOW
LOCATION DETAILED: POSTERIOR MID-PARIETAL SCALP
LOCATION DETAILED: LEFT KNEE
LOCATION DETAILED: LEFT CAVUM CONCHA

## 2024-03-06 ASSESSMENT — LOCATION SIMPLE DESCRIPTION DERM
LOCATION SIMPLE: LEFT EAR
LOCATION SIMPLE: RIGHT KNEE
LOCATION SIMPLE: INFERIOR FOREHEAD
LOCATION SIMPLE: LEFT KNEE
LOCATION SIMPLE: RIGHT THIGH
LOCATION SIMPLE: RIGHT EAR
LOCATION SIMPLE: RIGHT UPPER BACK
LOCATION SIMPLE: POSTERIOR SCALP
LOCATION SIMPLE: LEFT SCALP
LOCATION SIMPLE: LEFT UPPER BACK
LOCATION SIMPLE: LEFT ELBOW
LOCATION SIMPLE: LEFT FOREHEAD
LOCATION SIMPLE: SCALP
LOCATION SIMPLE: RIGHT ELBOW
LOCATION SIMPLE: LEFT THIGH

## 2024-03-06 ASSESSMENT — BSA PSORIASIS: % BODY COVERED IN PSORIASIS: 0

## 2024-03-06 ASSESSMENT — ITCH NUMERIC RATING SCALE: HOW SEVERE IS YOUR ITCHING?: 0

## 2024-03-06 ASSESSMENT — PGA PSORIASIS: PGA PSORIASIS 2020: CLEAR

## 2024-03-06 NOTE — PROCEDURE: TREATMENT REGIMEN
Continue Regimen: MTX 2.5mg 6 tabs q week\\nFolic Acid 1mg qd on non-MTX days\\nBetamethasone Aug cream bid weekends prn\\nCalcipotriene bid on weekdays prn
Plan: Pt due for next set of lab work in June or July. Pt reports he would rather try to increase folic acid with his diet than add on more pills. Reviewed foods high in folic acid and pt will try to eat more of these.
Sig For Treatment 1 (If Needed): 15 mg weekly
Action 1: Continue
Sig For Treatment 3 (If Needed): applied twice daily Monday-Friday
Detail Level: Zone
Sig For Treatment 2 (If Needed): twice daily

## 2024-03-06 NOTE — PROCEDURE: LIQUID NITROGEN
Include Z78.9 (Other Specified Conditions Influencing Health Status) As An Associated Diagnosis?: Yes
Detail Level: Detailed
Spray Paint Text: The liquid nitrogen was applied to the skin utilizing a spray paint frosting technique.
Show Aperture Variable?: No
Number Of Freeze-Thaw Cycles: 1 freeze-thaw cycle
Post-Care Instructions: I reviewed with the patient in detail post-care instructions. Patient is to wear sun protection, and avoid picking at any of the treated lesions. Pt should wash gently, pat dry. No need for a bandage unless site is rubbing on clothing. No vaseline/Aquaphor needed. Keep bandage dry if using one.
Consent: The patient's consent was obtained including but not limited to risks of crusting, scabbing, blistering, scarring, darker or lighter pigmentary change, recurrence, incomplete removal and infection.
Duration Of Freeze Thaw-Cycle (Seconds): 10-15
Medical Necessity Information: It is in your best interest to select a reason for this procedure from the list below. All of these items fulfill various CMS LCD requirements except the new and changing color options.
Medical Necessity Clause: This procedure was medically necessary because the lesions that were treated were:
Application Tool (Optional): Cotton Tipped Applicator
Duration Of Freeze Thaw-Cycle (Seconds): 5
Post-Care Instructions: I reviewed with the patient in detail post-care instructions. Patient is to wear sunprotection, and avoid picking at any of the treated lesions. Pt may apply Vaseline to crusted or scabbing areas.
Clothing

## 2024-03-06 NOTE — PROCEDURE: COUNSELING
Detail Level: Zone
Detail Level: Generalized
Sunscreen Recommendations: Suggest regular use of sunscreen SPF 30+
Sunscreen Recommendation Label Override: Broad Spectrum Sunscreen
Sunscreen Recommendation Label Override: Sunscreen
Sunscreen Recommendations: Suggest regular use of sunscreen SPF 30

## 2024-03-06 NOTE — PROCEDURE: ORDER TESTS
Billing Type: Third-Party Bill
Performing Laboratory: 0
Expected Date Of Service: 03/06/2024
Bill For Surgical Tray: no

## 2024-03-06 NOTE — PROCEDURE: METHOTREXATE MONITORING
Dosage 13 (Mg/Week): 0
Add Additional Dosage: No
Comments: Pt has been using 6 tabs weekly of MTX.  Pt reports he has never had side effects with any dose and pleased with his results.
Length Of Therapy (Optional): started 2017
Most Recent Lfts (Optional): WNL 1/2024
Add High Risk Medication Management Associated Diagnosis?: Yes
Document Previous Cumulative Dosage (Historical Patients Only): No - New Methotrexate Patient
Calculate Cumulative Dosage Automatically?: No - Use Free Text
Most Recent Cbc (Optional): H/H slightly low 1/2024
Current Dosage: 15mg/week
Detail Level: Zone

## 2024-05-06 RX ORDER — BETAMETHASONE DIPROPIONATE 0.5 MG/G
CREAM, AUGMENTED TOPICAL
Qty: 50 | Refills: 3 | Status: ERX

## 2024-06-24 ENCOUNTER — APPOINTMENT (OUTPATIENT)
Dept: URBAN - METROPOLITAN AREA CLINIC 232 | Age: 69
Setting detail: DERMATOLOGY
End: 2024-06-25

## 2024-06-24 DIAGNOSIS — L40.0 PSORIASIS VULGARIS: ICD-10-CM

## 2024-06-24 PROCEDURE — OTHER ORDER TESTS: OTHER

## 2024-06-24 PROCEDURE — OTHER TREATMENT REGIMEN: OTHER

## 2024-06-24 PROCEDURE — OTHER METHOTREXATE MONITORING: OTHER

## 2024-06-24 PROCEDURE — OTHER COUNSELING: OTHER

## 2024-06-24 ASSESSMENT — LOCATION ZONE DERM
LOCATION ZONE: SCALP
LOCATION ZONE: ARM
LOCATION ZONE: EAR
LOCATION ZONE: LEG

## 2024-06-24 ASSESSMENT — LOCATION DETAILED DESCRIPTION DERM
LOCATION DETAILED: RIGHT KNEE
LOCATION DETAILED: LEFT ELBOW
LOCATION DETAILED: RIGHT CRUS OF HELIX
LOCATION DETAILED: MID-OCCIPITAL SCALP
LOCATION DETAILED: LEFT KNEE
LOCATION DETAILED: LEFT CAVUM CONCHA
LOCATION DETAILED: RIGHT ELBOW

## 2024-06-24 ASSESSMENT — LOCATION SIMPLE DESCRIPTION DERM
LOCATION SIMPLE: LEFT KNEE
LOCATION SIMPLE: POSTERIOR SCALP
LOCATION SIMPLE: RIGHT EAR
LOCATION SIMPLE: RIGHT ELBOW
LOCATION SIMPLE: RIGHT KNEE
LOCATION SIMPLE: LEFT ELBOW
LOCATION SIMPLE: LEFT EAR

## 2024-06-24 NOTE — PROCEDURE: TREATMENT REGIMEN
Continue Regimen: MTX 2.5mg 6 tabs q week\\nFolic Acid 1mg qd on non-MTX days\\nBetamethasone Aug cream bid weekends prn\\nCalcipotriene bid on weekdays prn
Plan: Pt due for next set of lab work in Oct. Will mail labslip to home address.
Sig For Treatment 1 (If Needed): 15 mg weekly
Action 1: Continue
Sig For Treatment 3 (If Needed): applied twice daily Monday-Friday
Detail Level: Zone
Sig For Treatment 2 (If Needed): twice daily

## 2024-06-24 NOTE — PROCEDURE: ORDER TESTS
Billing Type: Third-Party Bill
Performing Laboratory: 0
Expected Date Of Service: 06/24/2024
Bill For Surgical Tray: no

## 2024-06-24 NOTE — PROCEDURE: METHOTREXATE MONITORING
Dosage 13 (Mg/Week): 0
Add Additional Dosage: No
Comments: Pt has been using 6 tabs weekly of MTX.  Pt reports he has never had side effects with any dose and pleased with his results in the past
Length Of Therapy (Optional): started 2017
Most Recent Lfts (Optional): WNL 6/2024
Add High Risk Medication Management Associated Diagnosis?: Yes
Document Previous Cumulative Dosage (Historical Patients Only): No - New Methotrexate Patient
Calculate Cumulative Dosage Automatically?: No - Use Free Text
Most Recent Cbc (Optional): H/H slightly low 6/2024
Current Dosage: 15mg/week
Detail Level: Zone

## 2024-08-01 NOTE — PROCEDURE: METHOTREXATE MONITORING
Most Recent Lfts (Optional): WNL 4/12/22
Current Dosage (Optional): 15mg/week
DISPLAY PLAN FREE TEXT
Length Of Therapy (Optional): 3 yrs
Add High Risk Medication Management Associated Diagnosis?: Yes
Comments: Pt has been using 6 tabs weekly of MTX.  Pt reports he has never had side effects with any dose. Since pt has been very stable with labs since he has been on the MTX starting 3 years ago.
Detail Level: Zone

## 2024-08-06 RX ORDER — CALCIPOTRIENE 50 UG/G
CREAM TOPICAL
Qty: 180 | Refills: 3 | Status: ERX

## 2024-10-09 ENCOUNTER — APPOINTMENT (OUTPATIENT)
Dept: URBAN - METROPOLITAN AREA CLINIC 232 | Age: 69
Setting detail: DERMATOLOGY
End: 2024-10-09

## 2024-10-09 DIAGNOSIS — L40.0 PSORIASIS VULGARIS: ICD-10-CM

## 2024-10-09 PROCEDURE — OTHER COUNSELING: OTHER

## 2024-10-09 PROCEDURE — OTHER ORDER TESTS: OTHER

## 2024-10-09 PROCEDURE — OTHER TREATMENT REGIMEN: OTHER

## 2024-10-09 PROCEDURE — OTHER METHOTREXATE MONITORING: OTHER

## 2024-10-09 ASSESSMENT — LOCATION SIMPLE DESCRIPTION DERM
LOCATION SIMPLE: RIGHT ELBOW
LOCATION SIMPLE: RIGHT EAR
LOCATION SIMPLE: LEFT KNEE
LOCATION SIMPLE: RIGHT KNEE
LOCATION SIMPLE: LEFT ELBOW
LOCATION SIMPLE: LEFT EAR
LOCATION SIMPLE: POSTERIOR SCALP

## 2024-10-09 ASSESSMENT — LOCATION DETAILED DESCRIPTION DERM
LOCATION DETAILED: RIGHT KNEE
LOCATION DETAILED: RIGHT CRUS OF HELIX
LOCATION DETAILED: RIGHT ELBOW
LOCATION DETAILED: MID-OCCIPITAL SCALP
LOCATION DETAILED: LEFT KNEE
LOCATION DETAILED: LEFT CAVUM CONCHA
LOCATION DETAILED: LEFT ELBOW

## 2024-10-09 ASSESSMENT — LOCATION ZONE DERM
LOCATION ZONE: SCALP
LOCATION ZONE: LEG
LOCATION ZONE: EAR
LOCATION ZONE: ARM

## 2024-10-09 NOTE — PROCEDURE: TREATMENT REGIMEN
Continue Regimen: MTX 2.5mg 6 tabs q week\\nFolic Acid 1mg qd on non-MTX days\\nBetamethasone Aug cream bid weekends prn\\nCalcipotriene bid on weekdays prn
Plan: Pt due for next set of lab work in Jan/Feb. Will mail labslip to home address.
Sig For Treatment 1 (If Needed): 15 mg weekly
Action 1: Continue
Sig For Treatment 3 (If Needed): applied twice daily Monday-Friday
Detail Level: Zone
Sig For Treatment 2 (If Needed): twice daily

## 2025-02-21 ENCOUNTER — RX ONLY (RX ONLY)
Age: 70
End: 2025-02-21

## 2025-02-21 RX ORDER — METHOTREXATE SODIUM 2.5 MG/1
TABLET ORAL QWEEK
Qty: 72 | Refills: 0 | Status: ERX

## 2025-03-06 ENCOUNTER — APPOINTMENT (OUTPATIENT)
Dept: URBAN - METROPOLITAN AREA CLINIC 232 | Age: 70
Setting detail: DERMATOLOGY
End: 2025-03-06

## 2025-03-06 VITALS — WEIGHT: 188 LBS | HEIGHT: 68 IN

## 2025-03-06 DIAGNOSIS — D22 MELANOCYTIC NEVI: ICD-10-CM

## 2025-03-06 DIAGNOSIS — D18.0 HEMANGIOMA: ICD-10-CM

## 2025-03-06 DIAGNOSIS — L40.0 PSORIASIS VULGARIS: ICD-10-CM

## 2025-03-06 DIAGNOSIS — L81.4 OTHER MELANIN HYPERPIGMENTATION: ICD-10-CM

## 2025-03-06 DIAGNOSIS — L82.1 OTHER SEBORRHEIC KERATOSIS: ICD-10-CM

## 2025-03-06 PROBLEM — D22.5 MELANOCYTIC NEVI OF TRUNK: Status: ACTIVE | Noted: 2025-03-06

## 2025-03-06 PROBLEM — D18.01 HEMANGIOMA OF SKIN AND SUBCUTANEOUS TISSUE: Status: ACTIVE | Noted: 2025-03-06

## 2025-03-06 PROCEDURE — OTHER COUNSELING: OTHER

## 2025-03-06 PROCEDURE — 99213 OFFICE O/P EST LOW 20 MIN: CPT

## 2025-03-06 PROCEDURE — OTHER ORDER TESTS: OTHER

## 2025-03-06 PROCEDURE — OTHER PRESCRIPTION: OTHER

## 2025-03-06 PROCEDURE — OTHER TREATMENT REGIMEN: OTHER

## 2025-03-06 PROCEDURE — OTHER METHOTREXATE MONITORING: OTHER

## 2025-03-06 PROCEDURE — OTHER MIPS QUALITY: OTHER

## 2025-03-06 RX ORDER — FOLIC ACID 1 MG/1
TABLET ORAL QD
Qty: 90 | Refills: 3 | Status: ERX

## 2025-03-06 ASSESSMENT — LOCATION DETAILED DESCRIPTION DERM
LOCATION DETAILED: LEFT ELBOW
LOCATION DETAILED: LEFT SUPERIOR MEDIAL UPPER BACK
LOCATION DETAILED: LEFT MEDIAL UPPER BACK
LOCATION DETAILED: RIGHT SUPERIOR MEDIAL UPPER BACK
LOCATION DETAILED: RIGHT ELBOW
LOCATION DETAILED: INFERIOR MID FOREHEAD
LOCATION DETAILED: RIGHT LATERAL DISTAL PRETIBIAL REGION

## 2025-03-06 ASSESSMENT — LOCATION SIMPLE DESCRIPTION DERM
LOCATION SIMPLE: RIGHT UPPER BACK
LOCATION SIMPLE: LEFT ELBOW
LOCATION SIMPLE: INFERIOR FOREHEAD
LOCATION SIMPLE: RIGHT PRETIBIAL REGION
LOCATION SIMPLE: RIGHT ELBOW
LOCATION SIMPLE: LEFT UPPER BACK

## 2025-03-06 ASSESSMENT — LOCATION ZONE DERM
LOCATION ZONE: LEG
LOCATION ZONE: FACE
LOCATION ZONE: TRUNK
LOCATION ZONE: ARM

## 2025-03-06 ASSESSMENT — ITCH NUMERIC RATING SCALE: HOW SEVERE IS YOUR ITCHING?: 0

## 2025-03-06 ASSESSMENT — PGA PSORIASIS: PGA PSORIASIS 2020: ALMOST CLEAR

## 2025-03-06 ASSESSMENT — BSA PSORIASIS: % BODY COVERED IN PSORIASIS: 1

## 2025-03-06 NOTE — PROCEDURE: ORDER TESTS
Billing Type: Third-Party Bill
Performing Laboratory: 0
Expected Date Of Service: 03/06/2025
Bill For Surgical Tray: no

## 2025-03-06 NOTE — PROCEDURE: TREATMENT REGIMEN
Detail Level: Zone
Plan: Pt is due for labwork May/June. Since he has been near clear, he will try to taper MTX to 3 tabs q week. If still doing well after 2 months, pt will d/c MTX. If he flares, he will go back up to 10mg/wk. Pt plans on losing more weight. Encouraged pt to continue to get to his ideal body weight for best mgmt of his psoriasis and lower his CV, DM risk.
Continue Regimen: methotrexate sodium 2.5 mg 4 tabs q week\\nFolic acid 1mg tablet on non MTX days\\nAug Betamethasone Diproprionate bid weekends only \\nCalcipotriene cream bid weekdays-pt will need a new prior auth but has med at home so will call for refill and PA can be done then. \\nIf pt has a flare, he may hold the calcipotriene and just use Aug Betamethsone bid for 2 weeks

## 2025-03-06 NOTE — PROCEDURE: METHOTREXATE MONITORING
Dosage 13 (Mg/Week): 0
Add Additional Dosage: No
Comments: Pt has maintained near clear status on 2.5mg 4 tabs weekly
Length Of Therapy (Optional): started 2017
Most Recent Lfts (Optional): WNL 2/6/25
Add High Risk Medication Management Associated Diagnosis?: Yes
Document Previous Cumulative Dosage (Historical Patients Only): No - New Methotrexate Patient
Calculate Cumulative Dosage Automatically?: No - Use Free Text
Most Recent Cbc (Optional): H/H WNL 2/6/25
Current Dosage: 10mg/week
Detail Level: Zone

## 2025-06-25 ENCOUNTER — APPOINTMENT (OUTPATIENT)
Dept: URBAN - METROPOLITAN AREA CLINIC 232 | Age: 70
Setting detail: DERMATOLOGY
End: 2025-06-25

## 2025-06-25 DIAGNOSIS — L40.0 PSORIASIS VULGARIS: ICD-10-CM

## 2025-06-25 PROCEDURE — OTHER METHOTREXATE MONITORING: OTHER

## 2025-06-25 PROCEDURE — OTHER ORDER TESTS: OTHER

## 2025-06-25 PROCEDURE — OTHER COUNSELING: OTHER

## 2025-06-25 PROCEDURE — OTHER TREATMENT REGIMEN: OTHER

## 2025-06-25 ASSESSMENT — LOCATION DETAILED DESCRIPTION DERM
LOCATION DETAILED: RIGHT LATERAL DISTAL PRETIBIAL REGION
LOCATION DETAILED: RIGHT ELBOW
LOCATION DETAILED: LEFT ELBOW

## 2025-06-25 ASSESSMENT — LOCATION ZONE DERM
LOCATION ZONE: ARM
LOCATION ZONE: LEG

## 2025-06-25 ASSESSMENT — LOCATION SIMPLE DESCRIPTION DERM
LOCATION SIMPLE: RIGHT ELBOW
LOCATION SIMPLE: RIGHT PRETIBIAL REGION
LOCATION SIMPLE: LEFT ELBOW

## 2025-08-12 ENCOUNTER — RX ONLY (RX ONLY)
Age: 70
End: 2025-08-12

## 2025-08-12 RX ORDER — FOLIC ACID 1 MG/1
TABLET ORAL QD
Qty: 90 | Refills: 3 | Status: ERX

## 2025-08-12 RX ORDER — BETAMETHASONE DIPROPIONATE 0.5 MG/G
CREAM, AUGMENTED TOPICAL
Qty: 50 | Refills: 3 | Status: ERX

## 2025-08-12 RX ORDER — METHOTREXATE SODIUM 2.5 MG/1
TABLET ORAL QWEEK
Qty: 72 | Refills: 3 | Status: ERX

## 2025-08-12 RX ORDER — CALCIPOTRIENE 50 UG/G
CREAM TOPICAL
Qty: 180 | Refills: 3 | Status: ERX